# Patient Record
Sex: MALE | Race: BLACK OR AFRICAN AMERICAN | NOT HISPANIC OR LATINO | Employment: FULL TIME | URBAN - METROPOLITAN AREA
[De-identification: names, ages, dates, MRNs, and addresses within clinical notes are randomized per-mention and may not be internally consistent; named-entity substitution may affect disease eponyms.]

---

## 2023-09-21 ENCOUNTER — HOSPITAL ENCOUNTER (EMERGENCY)
Facility: CLINIC | Age: 38
Discharge: HOME | End: 2023-09-21
Attending: EMERGENCY MEDICINE | Admitting: EMERGENCY MEDICINE
Payer: COMMERCIAL

## 2023-09-21 ENCOUNTER — APPOINTMENT (OUTPATIENT)
Dept: CT IMAGING | Facility: CLINIC | Age: 38
End: 2023-09-21
Attending: PHYSICIAN ASSISTANT
Payer: COMMERCIAL

## 2023-09-21 VITALS
SYSTOLIC BLOOD PRESSURE: 168 MMHG | WEIGHT: 300 LBS | DIASTOLIC BLOOD PRESSURE: 98 MMHG | TEMPERATURE: 99.1 F | RESPIRATION RATE: 25 BRPM | BODY MASS INDEX: 42 KG/M2 | HEIGHT: 71 IN | OXYGEN SATURATION: 95 % | HEART RATE: 112 BPM

## 2023-09-21 DIAGNOSIS — J20.9 ACUTE BRONCHITIS: ICD-10-CM

## 2023-09-21 DIAGNOSIS — R07.89 CHEST TIGHTNESS: ICD-10-CM

## 2023-09-21 LAB
ALBUMIN SERPL BCG-MCNC: 4.5 G/DL (ref 3.5–5.2)
ALP SERPL-CCNC: 54 U/L (ref 40–129)
ALT SERPL W P-5'-P-CCNC: 91 U/L (ref 0–70)
ANION GAP SERPL CALCULATED.3IONS-SCNC: 11 MMOL/L (ref 7–15)
AST SERPL W P-5'-P-CCNC: 66 U/L (ref 0–45)
ATRIAL RATE - MUSE: 116 BPM
BASOPHILS # BLD AUTO: 0 10E3/UL (ref 0–0.2)
BASOPHILS NFR BLD AUTO: 0 %
BILIRUB DIRECT SERPL-MCNC: 0.26 MG/DL (ref 0–0.3)
BILIRUB SERPL-MCNC: 0.9 MG/DL
BUN SERPL-MCNC: 21 MG/DL (ref 6–20)
CALCIUM SERPL-MCNC: 9 MG/DL (ref 8.6–10)
CHLORIDE SERPL-SCNC: 99 MMOL/L (ref 98–107)
CREAT SERPL-MCNC: 1.44 MG/DL (ref 0.67–1.17)
DEPRECATED HCO3 PLAS-SCNC: 28 MMOL/L (ref 22–29)
DIASTOLIC BLOOD PRESSURE - MUSE: NORMAL MMHG
EGFRCR SERPLBLD CKD-EPI 2021: 65 ML/MIN/1.73M2
EOSINOPHIL # BLD AUTO: 0.4 10E3/UL (ref 0–0.7)
EOSINOPHIL NFR BLD AUTO: 3 %
ERYTHROCYTE [DISTWIDTH] IN BLOOD BY AUTOMATED COUNT: 13 % (ref 10–15)
GLUCOSE SERPL-MCNC: 107 MG/DL (ref 70–99)
HCT VFR BLD AUTO: 41.6 % (ref 40–53)
HGB BLD-MCNC: 14.4 G/DL (ref 13.3–17.7)
IMM GRANULOCYTES # BLD: 0.1 10E3/UL
IMM GRANULOCYTES NFR BLD: 0 %
INTERPRETATION ECG - MUSE: NORMAL
LYMPHOCYTES # BLD AUTO: 2.6 10E3/UL (ref 0.8–5.3)
LYMPHOCYTES NFR BLD AUTO: 21 %
MCH RBC QN AUTO: 32.7 PG (ref 26.5–33)
MCHC RBC AUTO-ENTMCNC: 34.6 G/DL (ref 31.5–36.5)
MCV RBC AUTO: 94 FL (ref 78–100)
MONOCYTES # BLD AUTO: 1 10E3/UL (ref 0–1.3)
MONOCYTES NFR BLD AUTO: 8 %
NEUTROPHILS # BLD AUTO: 8 10E3/UL (ref 1.6–8.3)
NEUTROPHILS NFR BLD AUTO: 68 %
NRBC # BLD AUTO: 0 10E3/UL
NRBC BLD AUTO-RTO: 0 /100
P AXIS - MUSE: 37 DEGREES
PLATELET # BLD AUTO: 273 10E3/UL (ref 150–450)
POTASSIUM SERPL-SCNC: 3.9 MMOL/L (ref 3.4–5.3)
PR INTERVAL - MUSE: 156 MS
PROT SERPL-MCNC: 6.9 G/DL (ref 6.4–8.3)
QRS DURATION - MUSE: 88 MS
QT - MUSE: 352 MS
QTC - MUSE: 489 MS
R AXIS - MUSE: 36 DEGREES
RBC # BLD AUTO: 4.41 10E6/UL (ref 4.4–5.9)
SODIUM SERPL-SCNC: 138 MMOL/L (ref 136–145)
SYSTOLIC BLOOD PRESSURE - MUSE: NORMAL MMHG
T AXIS - MUSE: 50 DEGREES
TROPONIN T SERPL HS-MCNC: 18 NG/L
TROPONIN T SERPL HS-MCNC: 18 NG/L
VENTRICULAR RATE- MUSE: 116 BPM
WBC # BLD AUTO: 12 10E3/UL (ref 4–11)

## 2023-09-21 PROCEDURE — 71275 CT ANGIOGRAPHY CHEST: CPT

## 2023-09-21 PROCEDURE — 99285 EMERGENCY DEPT VISIT HI MDM: CPT | Mod: 25

## 2023-09-21 PROCEDURE — 250N000011 HC RX IP 250 OP 636: Mod: JZ | Performed by: EMERGENCY MEDICINE

## 2023-09-21 PROCEDURE — 94640 AIRWAY INHALATION TREATMENT: CPT

## 2023-09-21 PROCEDURE — 36415 COLL VENOUS BLD VENIPUNCTURE: CPT | Performed by: PHYSICIAN ASSISTANT

## 2023-09-21 PROCEDURE — 84484 ASSAY OF TROPONIN QUANT: CPT | Performed by: PHYSICIAN ASSISTANT

## 2023-09-21 PROCEDURE — 250N000013 HC RX MED GY IP 250 OP 250 PS 637: Performed by: PHYSICIAN ASSISTANT

## 2023-09-21 PROCEDURE — 82248 BILIRUBIN DIRECT: CPT | Performed by: PHYSICIAN ASSISTANT

## 2023-09-21 PROCEDURE — 96374 THER/PROPH/DIAG INJ IV PUSH: CPT | Mod: 59

## 2023-09-21 PROCEDURE — 250N000012 HC RX MED GY IP 250 OP 636 PS 637: Performed by: PHYSICIAN ASSISTANT

## 2023-09-21 PROCEDURE — 80053 COMPREHEN METABOLIC PANEL: CPT | Performed by: PHYSICIAN ASSISTANT

## 2023-09-21 PROCEDURE — 85025 COMPLETE CBC W/AUTO DIFF WBC: CPT | Performed by: PHYSICIAN ASSISTANT

## 2023-09-21 PROCEDURE — 93005 ELECTROCARDIOGRAM TRACING: CPT | Performed by: EMERGENCY MEDICINE

## 2023-09-21 PROCEDURE — 250N000011 HC RX IP 250 OP 636: Performed by: PHYSICIAN ASSISTANT

## 2023-09-21 RX ORDER — IOPAMIDOL 755 MG/ML
75 INJECTION, SOLUTION INTRAVASCULAR ONCE
Status: COMPLETED | OUTPATIENT
Start: 2023-09-21 | End: 2023-09-21

## 2023-09-21 RX ORDER — ALBUTEROL SULFATE 90 UG/1
2 AEROSOL, METERED RESPIRATORY (INHALATION) EVERY 6 HOURS PRN
Qty: 18 G | Refills: 0 | Status: SHIPPED | OUTPATIENT
Start: 2023-09-21

## 2023-09-21 RX ORDER — ALBUTEROL SULFATE 90 UG/1
2 AEROSOL, METERED RESPIRATORY (INHALATION) EVERY 6 HOURS PRN
Status: DISCONTINUED | OUTPATIENT
Start: 2023-09-21 | End: 2023-09-22 | Stop reason: HOSPADM

## 2023-09-21 RX ORDER — MORPHINE SULFATE 4 MG/ML
4 INJECTION, SOLUTION INTRAMUSCULAR; INTRAVENOUS ONCE
Status: COMPLETED | OUTPATIENT
Start: 2023-09-21 | End: 2023-09-21

## 2023-09-21 RX ORDER — PREDNISONE 20 MG/1
40 TABLET ORAL ONCE
Status: COMPLETED | OUTPATIENT
Start: 2023-09-21 | End: 2023-09-21

## 2023-09-21 RX ORDER — PREDNISONE 20 MG/1
TABLET ORAL
Qty: 10 TABLET | Refills: 0 | Status: SHIPPED | OUTPATIENT
Start: 2023-09-21

## 2023-09-21 RX ADMIN — MORPHINE SULFATE 4 MG: 4 INJECTION, SOLUTION INTRAMUSCULAR; INTRAVENOUS at 17:30

## 2023-09-21 RX ADMIN — IOPAMIDOL 75 ML: 755 INJECTION, SOLUTION INTRAVENOUS at 18:23

## 2023-09-21 RX ADMIN — ALBUTEROL SULFATE 2 PUFF: 90 AEROSOL, METERED RESPIRATORY (INHALATION) at 20:45

## 2023-09-21 RX ADMIN — PREDNISONE 40 MG: 20 TABLET ORAL at 19:42

## 2023-09-21 ASSESSMENT — ACTIVITIES OF DAILY LIVING (ADL)
ADLS_ACUITY_SCORE: 33
ADLS_ACUITY_SCORE: 35
ADLS_ACUITY_SCORE: 35

## 2023-09-21 ASSESSMENT — ENCOUNTER SYMPTOMS
BACK PAIN: 0
SHORTNESS OF BREATH: 1
FEVER: 0

## 2023-09-21 NOTE — ED PROVIDER NOTES
EMERGENCY DEPARTMENT ENCOUNTER      NAME: Connor Noriega  AGE: 38 year old male  YOB: 1985  MRN: [id number]  EVALUATION DATE & TIME: 9/21/2023  4:51 PM    PCP: No primary care provider on file.    ED PROVIDER: Saroj Laboy PA-C      Chief Complaint   Patient presents with    Chest Pain    Shortness of Breath     FINAL IMPRESSION:  1. Acute bronchitis    2. Chest tightness      ED COURSE & MEDICAL DECISION MAKING:    Pertinent Labs & Imaging studies reviewed. (See chart for details)  4:57 PM I met the patient and performed my initial interview and exam.   5:18 PM Staffed with Dr. Boss  7:22 PM Patient seen and updated on imaging and laboratory studies.   8:21 PM I rechecked and updated the patient regarding plan of care      38 year old male presents to the Emergency Department for evaluation of right sided substernal chest pressure     ED Course as of 09/21/23 2238   Thu Sep 21, 2023   1703 Patient is a 38-year-old male, presents emergency department for evaluation of right-sided chest pain, started suddenly 2 hours ago, sharp substernal pain.  Patient also endorsing some shortness of breath.  History of hypertension, uncontrolled, not taking medications for it.  He denies any fever, cough, cold, chills.  Denies any nausea or vomiting.  No abdominal pain.  No previous cardiac history in the past. EKG here shows sinus tachycardia, without any other acute abnormalities.  Patient not any other significant past medical history.  Differential at this point includes ACS, less likely pulmonary embolism, pneumothorax, or dissection.  Additionally, unlikely to be intra-abdominal pathology as patient does not have any significant abdominal pain, rebound or guarding.  We will plan for basic laboratory studies here including CBC, BMP, troponin, hepatic function, as well as CT PE study given his tachycardia, and substernal chest pain.  Patient be given a dose of morphine here in the emergency  department for symptom management.   1753 Troponin T, High Sensitivity: 18   1812 Hepatic function shows mild elevation in AST and ALT, likely does not count here is 12, mild elevation in his creatinine, however otherwise no acute abnormalities.  Nothing compared to previous.   1856 CT Chest Pulmonary Embolism w Contrast   1856 CT PE study here shows limited exam, no evidence of central or large segmental pulmonary embolus.  Findings consistent with right lower lobe small airway disease, cardiomegaly.  No other acute abnormalities.   2013 Delta troponin here is stable.   2023 Seen and reevaluated here in the emergency department.  Has been stable throughout his duration, contains some mild tachycardia, or thinks likely secondary to his bronchitis.  I have prescribed him a short course of steroids, as well as an albuterol inhaler for his symptoms.  He is agreeable with this plan.  We discussed return precautions, expressed understanding.  Plan for discharge at this time.     Medical Decision Making    History:  Supplemental history from: Documented in chart, if applicable  External Record(s) reviewed: Documented in chart, if applicable.    Work Up:  Chart documentation includes differential considered and any EKGs or imaging independently interpreted by provider, where specified.  In additional to work up documented, I considered the following work up: Documented in chart, if applicable.    External consultation:  Discussion of management with another provider: Documented in chart, if applicable    Complicating factors:  Care impacted by chronic illness: Hypertension  Care affected by social determinants of health: N/A    Disposition considerations: Discharge. I prescribed additional prescription strength medication(s) as charted. N/A.       At the conclusion of the encounter I discussed the results of all of the tests and the disposition. The questions were answered. The patient or family acknowledged understanding  and was agreeable with the care plan.       0 minutes of critical care time     MEDICATIONS GIVEN IN THE EMERGENCY:  Medications   albuterol (PROVENTIL HFA/VENTOLIN HFA) inhaler (2 puffs Inhalation $Given 9/21/23 2045)   morphine (PF) injection 4 mg (4 mg Intravenous $Given 9/21/23 1730)   iopamidol (ISOVUE-370) solution 75 mL (75 mLs Intravenous $Given 9/21/23 1823)   predniSONE (DELTASONE) tablet 40 mg (40 mg Oral $Given 9/21/23 1942)       NEW PRESCRIPTIONS STARTED AT TODAY'S ER VISIT  New Prescriptions    ALBUTEROL (PROAIR HFA/PROVENTIL HFA/VENTOLIN HFA) 108 (90 BASE) MCG/ACT INHALER    Inhale 2 puffs into the lungs every 6 hours as needed for shortness of breath, wheezing or cough    PREDNISONE (DELTASONE) 20 MG TABLET    Take two tablets (= 40mg) each day for 5 (five) days          =================================================================    HPI    Patient information was obtained from: patient     Use of : N/A       Connor Noriega is a 38 year old male with a pertinent history of HTN who presents to this ED via walk-in for evaluation of chest pain.    Earlier today, patient reports a sudden onset of chest pain, feeling tight and substernal. It worsens with moving or breathing, and also has some related shortness of breath. It does not radiate to his back and he has not taken any pain medication for it. He reports no recent heavy lifting, and denies fever.    He has hypertension, but does not take any medication for it.      REVIEW OF SYSTEMS   Review of Systems   Constitutional:  Negative for fever.   Respiratory:  Positive for shortness of breath.    Cardiovascular:  Positive for chest pain (substernal).   Musculoskeletal:  Negative for back pain.     PAST MEDICAL HISTORY:  History reviewed. No pertinent past medical history.    PAST SURGICAL HISTORY:  History reviewed. No pertinent surgical history.        CURRENT MEDICATIONS:    albuterol (PROAIR HFA/PROVENTIL HFA/VENTOLIN HFA) 108 (90  "Base) MCG/ACT inhaler  predniSONE (DELTASONE) 20 MG tablet         ALLERGIES:  No Known Allergies    FAMILY HISTORY:  History reviewed. No pertinent family history.    SOCIAL HISTORY:   Social History     Socioeconomic History    Marital status: Single     Spouse name: None    Number of children: None    Years of education: None    Highest education level: None       VITALS:  BP (!) 168/98   Pulse 112   Temp 99.1  F (37.3  C) (Oral)   Resp 25   Ht 1.803 m (5' 11\")   Wt 136.1 kg (300 lb)   SpO2 95%   BMI 41.84 kg/m      PHYSICAL EXAM    Physical Exam  Vitals and nursing note reviewed.   Constitutional:       General: He is not in acute distress.     Appearance: Normal appearance. He is normal weight. He is not toxic-appearing or diaphoretic.   HENT:      Right Ear: External ear normal.      Left Ear: External ear normal.   Eyes:      Conjunctiva/sclera: Conjunctivae normal.   Cardiovascular:      Rate and Rhythm: Regular rhythm. Tachycardia present.      Heart sounds: Normal heart sounds. Heart sounds not distant. No murmur heard.     No systolic murmur is present.      No friction rub.   Pulmonary:      Effort: Pulmonary effort is normal. Tachypnea present.      Breath sounds: No decreased breath sounds, wheezing or rhonchi.   Chest:      Chest wall: No mass, tenderness or edema.   Musculoskeletal:      Right lower leg: No tenderness.      Left lower leg: No tenderness.   Skin:     Findings: No erythema or rash.   Neurological:      Mental Status: He is alert. Mental status is at baseline.           LAB:  All pertinent labs reviewed and interpreted.  Labs Ordered and Resulted from Time of ED Arrival to Time of ED Departure   BASIC METABOLIC PANEL - Abnormal       Result Value    Sodium 138      Potassium 3.9      Chloride 99      Carbon Dioxide (CO2) 28      Anion Gap 11      Urea Nitrogen 21.0 (*)     Creatinine 1.44 (*)     Calcium 9.0      Glucose 107 (*)     GFR Estimate 65     HEPATIC FUNCTION PANEL - " Abnormal    Protein Total 6.9      Albumin 4.5      Bilirubin Total 0.9      Alkaline Phosphatase 54      AST 66 (*)     ALT 91 (*)     Bilirubin Direct 0.26     CBC WITH PLATELETS AND DIFFERENTIAL - Abnormal    WBC Count 12.0 (*)     RBC Count 4.41      Hemoglobin 14.4      Hematocrit 41.6      MCV 94      MCH 32.7      MCHC 34.6      RDW 13.0      Platelet Count 273      % Neutrophils 68      % Lymphocytes 21      % Monocytes 8      % Eosinophils 3      % Basophils 0      % Immature Granulocytes 0      NRBCs per 100 WBC 0      Absolute Neutrophils 8.0      Absolute Lymphocytes 2.6      Absolute Monocytes 1.0      Absolute Eosinophils 0.4      Absolute Basophils 0.0      Absolute Immature Granulocytes 0.1      Absolute NRBCs 0.0     TROPONIN T, HIGH SENSITIVITY - Normal    Troponin T, High Sensitivity 18     TROPONIN T, HIGH SENSITIVITY - Normal    Troponin T, High Sensitivity 18         RADIOLOGY:  Reviewed all pertinent imaging. Please see official radiology report.  CT Chest Pulmonary Embolism w Contrast   Final Result   IMPRESSION:   1.  Limited exam.   2.  No evidence of central or large segmental pulmonary embolus.   3.  Findings consistent with right lower lobe small airway disease/bronchitis with associated air trapping.   4.  Cardiomegaly.          EKG:    Performed at: 1643    Impression: Sinus Tachycardia    Rate: 116  Rhythm: Sinus Tachycardia  Axis: 37 36 50  IA Interval: 156  QRS Interval: 88  QTc Interval: 489  ST Changes: No ST elevation or depression  Comparison: No previous from comparsion    I have reviewed and interpreted the EKG(s) documented above along with Dr. Gera ED MD.    PROCEDURES:   None.       I, Eloy Durand, am serving as a scribe to document services personally performed by Saroj Laboy PA-C, based on my observation and the provider's statements to me. I, Saroj Laboy PA-C, attest that Eloy Durand is acting in a scribe capacity, has observed my performance  of the services and has documented them in accordance with my direction.    Saroj Laboy PA-C  Emergency Medicine  [facility] EMERGENCY ROOM  [address] [address]  [phone]  Dept: [phone]       Saroj Laboy PA-C  09/21/23 4143

## 2023-09-21 NOTE — ED TRIAGE NOTES
Patient began to have chest pain a few hours ago. Sharp substernal pain. Patient also endorses SOB.  Hx of HTN

## 2023-09-21 NOTE — ED PROVIDER NOTES
"I am seeing this patient along with Rosendo Laboy PA-C. I had a face to face encounter with this patient seen by the Advanced Practice Provider (JENNIFER).  I have seen, examined, and discussed the patient with the JENNIFER and agree with their assessment and plan of management. I personally saw the patient and performed a substantive portion of the visit including all aspects of the medical decision making.    HPI:  Connor Noriega is a 38 year old male with a pertinent history of HTN who presents to this ED via walk-in for evaluation of chest pain. Earlier today, patient reports a sudden onset of chest pain, feeling tight and substernal. It worsens with moving or breathing, and also has some related shortness of breath. It does not radiate to his back and he has not taken any pain medication for it. He reports no recent heavy lifting, and denies fever. He has hypertension, but does not take any medication for it.       ROS:    Review of Systems   Constitutional:  Negative for fever.   Respiratory:  Positive for shortness of breath.    Cardiovascular:  Positive for chest pain.   Musculoskeletal:  Negative for back pain.   All other systems reviewed and are negative.        Physical Exam:    VITAL SIGNS: BP (!) 207/106   Pulse 116   Temp 98.2  F (36.8  C) (Oral)   Resp 24   Ht 1.803 m (5' 11\")   Wt 136.1 kg (300 lb)   SpO2 99%   BMI 41.84 kg/m    Constitutional:  Awake, no acute distress         LABS  Pertinent lab results reviewed in chart.  Labs Ordered and Resulted from Time of ED Arrival to Time of ED Departure   CBC WITH PLATELETS AND DIFFERENTIAL - Abnormal       Result Value    WBC Count 12.0 (*)     RBC Count 4.41      Hemoglobin 14.4      Hematocrit 41.6      MCV 94      MCH 32.7      MCHC 34.6      RDW 13.0      Platelet Count 273      % Neutrophils 68      % Lymphocytes 21      % Monocytes 8      % Eosinophils 3      % Basophils 0      % Immature Granulocytes 0      NRBCs per 100 WBC 0      Absolute " Neutrophils 8.0      Absolute Lymphocytes 2.6      Absolute Monocytes 1.0      Absolute Eosinophils 0.4      Absolute Basophils 0.0      Absolute Immature Granulocytes 0.1      Absolute NRBCs 0.0     BASIC METABOLIC PANEL   TROPONIN T, HIGH SENSITIVITY   HEPATIC FUNCTION PANEL       EKG      RADIOLOGY  CT Chest Pulmonary Embolism w Contrast   Final Result   IMPRESSION:   1.  Limited exam.   2.  No evidence of central or large segmental pulmonary embolus.   3.  Findings consistent with right lower lobe small airway disease/bronchitis with associated air trapping.   4.  Cardiomegaly.          PROCEDURES   None.    ED COURSE & MEDICAL DECISION MAKING    Pertinent Labs and Imagaing reviewed (see chart for details)    38 year old male here with a few hours of tight substernal chest pain that is pleuritic in nature.  He was tachycardic on arrival with a temp of 99.1, slightly hypertensive.  Given his symptoms, there was moderate pretest probability for pulmonary embolus and thus a CT PE was ordered.  EKG and troponin are negative and I feel this adequately rules out ACS as it sounds atypical.  The PE study does not show a pulmonary embolus or bacterial infection, but shows findings consistent with bronchitis which explains the tightness he is feeling.  This is likely caused by a viral syndrome and we will discharge with reassurance.    At the conclusion of the encounter I discussed  the results of all of the tests and the disposition.   The questions were answered.  The patient or family acknowledged understanding and was agreeable with the care plan.       FINAL IMPRESSION      1. Acute bronchitis    2. Chest tightness            CRITICAL CARE  0 Minutes    Julisa Boss MD  9/21/2023 5:21 PM     Julisa Boss MD  09/21/23 2047

## 2023-09-22 NOTE — DISCHARGE INSTRUCTIONS
You were seen here in the emergency department for evaluation of chest tightness, shortness of breath.  Your work-up here does not show any evidence of blood clots, or cardiac abnormalities.  Your troponin here is stable, which is a measure for heart stress.  Your heart rate is mildly elevated, and I think this is likely secondary to some bronchitis.  I will put you on a short course of steroids, and sent you home with an albuterol inhaler as well.  Please follow with your primary care doctor, have referred you to a primary care doctor.    Return to the emergency department if develop any new or worsening symptoms.